# Patient Record
Sex: FEMALE | Race: WHITE | NOT HISPANIC OR LATINO | URBAN - METROPOLITAN AREA
[De-identification: names, ages, dates, MRNs, and addresses within clinical notes are randomized per-mention and may not be internally consistent; named-entity substitution may affect disease eponyms.]

---

## 2020-06-12 ENCOUNTER — TELEPHONE ENCOUNTER (OUTPATIENT)
Dept: URBAN - METROPOLITAN AREA CLINIC 92 | Facility: CLINIC | Age: 50
End: 2020-06-12

## 2020-06-12 RX ORDER — ESTRADIOL 2 MG/1
TAKE 1 TABLET (2 MG) BY ORAL ROUTE ONCE DAILY TABLET ORAL 1
Qty: 0 | Refills: 0 | Status: ACTIVE | COMMUNITY
Start: 1900-01-01 | End: 1900-01-01

## 2020-06-12 RX ORDER — RIFAXIMIN 550 MG/1
1 TABLET TABLET ORAL THREE TIMES A DAY
Qty: 42 TABLET | Refills: 3 | OUTPATIENT
Start: 2020-06-12 | End: 2020-08-07

## 2020-06-12 RX ORDER — B-COMPLEX WITH VITAMIN C
TABLET ORAL
Qty: 0 | Refills: 0 | Status: ACTIVE | COMMUNITY
Start: 1900-01-01 | End: 1900-01-01

## 2020-06-12 RX ORDER — FLUCONAZOLE 150 MG/1
TAKE 1 TABLET BY ORAL ROUTE EVERY OTHER WEEK TABLET ORAL 1
Qty: 1 | Refills: 0 | Status: ACTIVE | COMMUNITY
Start: 1900-01-01 | End: 1900-01-01

## 2020-06-12 RX ORDER — VITAMIN K2 40 MCG
TABLET ORAL
Qty: 0 | Refills: 0 | Status: ACTIVE | COMMUNITY
Start: 1900-01-01 | End: 1900-01-01

## 2020-06-12 RX ORDER — BUPROPION HCL 150 MG
TAKE 1 TABLET (150 MG) BY ORAL ROUTE ONCE DAILY TABLET, EXTENDED RELEASE 24 HR ORAL 1
Qty: 0 | Refills: 0 | Status: ACTIVE | COMMUNITY
Start: 1900-01-01 | End: 1900-01-01

## 2020-06-12 RX ORDER — PROGESTERONE 200 MG/1
TAKE 1 CAPSULE BY MOUTH ONCE DAILY CAPSULE ORAL
Qty: 0 | Refills: 0 | Status: ACTIVE | COMMUNITY
Start: 1900-01-01 | End: 1900-01-01

## 2020-06-16 ENCOUNTER — DASHBOARD ENCOUNTERS (OUTPATIENT)
Age: 50
End: 2020-06-16

## 2020-06-16 ENCOUNTER — OFFICE VISIT (OUTPATIENT)
Dept: URBAN - METROPOLITAN AREA TELEHEALTH 2 | Facility: TELEHEALTH | Age: 50
End: 2020-06-16
Payer: SELF-PAY

## 2020-06-16 DIAGNOSIS — R10.84 ABDOMINAL PAIN, GENERALIZED: ICD-10-CM

## 2020-06-16 DIAGNOSIS — R14.0 BLOATING: ICD-10-CM

## 2020-06-16 DIAGNOSIS — K58.2 IRRITABLE BOWEL SYNDROME WITH BOTH CONSTIPATION AND DIARRHEA: ICD-10-CM

## 2020-06-16 PROBLEM — 47812002: Status: ACTIVE | Noted: 2020-06-12

## 2020-06-16 PROCEDURE — G8427 DOCREV CUR MEDS BY ELIG CLIN: HCPCS | Performed by: NURSE PRACTITIONER

## 2020-06-16 PROCEDURE — 1036F TOBACCO NON-USER: CPT | Performed by: NURSE PRACTITIONER

## 2020-06-16 PROCEDURE — 99213 OFFICE O/P EST LOW 20 MIN: CPT | Performed by: NURSE PRACTITIONER

## 2020-06-16 PROCEDURE — G9903 PT SCRN TBCO ID AS NON USER: HCPCS | Performed by: NURSE PRACTITIONER

## 2020-06-16 PROCEDURE — G8420 CALC BMI NORM PARAMETERS: HCPCS | Performed by: NURSE PRACTITIONER

## 2020-06-16 RX ORDER — RIFAXIMIN 550 MG/1
1 TABLET TABLET ORAL THREE TIMES A DAY
Qty: 42 TABLET | Refills: 3
Start: 2020-06-12

## 2020-06-16 RX ORDER — ESTRADIOL 2 MG/1
TAKE 1 TABLET (2 MG) BY ORAL ROUTE ONCE DAILY TABLET ORAL 1
Qty: 0 | Refills: 0 | Status: ACTIVE | COMMUNITY
Start: 1900-01-01

## 2020-06-16 RX ORDER — FLUCONAZOLE 150 MG/1
TAKE 1 TABLET BY ORAL ROUTE EVERY OTHER WEEK TABLET ORAL 1
Qty: 1 | Refills: 0 | Status: ACTIVE | COMMUNITY
Start: 1900-01-01

## 2020-06-16 RX ORDER — RIFAXIMIN 550 MG/1
1 TABLET TABLET ORAL THREE TIMES A DAY
Qty: 42 TABLET | Refills: 3 | Status: ACTIVE | COMMUNITY
Start: 2020-06-12 | End: 2020-08-07

## 2020-06-16 RX ORDER — B-COMPLEX WITH VITAMIN C
TABLET ORAL
Qty: 0 | Refills: 0 | Status: ACTIVE | COMMUNITY
Start: 1900-01-01

## 2020-06-16 RX ORDER — BUPROPION HCL 150 MG
TAKE 1 TABLET (150 MG) BY ORAL ROUTE ONCE DAILY TABLET, EXTENDED RELEASE 24 HR ORAL 1
Qty: 0 | Refills: 0 | Status: ACTIVE | COMMUNITY
Start: 1900-01-01

## 2020-06-16 RX ORDER — PROGESTERONE 200 MG/1
TAKE 1 CAPSULE BY MOUTH ONCE DAILY CAPSULE ORAL
Qty: 0 | Refills: 0 | Status: ACTIVE | COMMUNITY
Start: 1900-01-01

## 2020-06-16 RX ORDER — VITAMIN K2 40 MCG
TABLET ORAL
Qty: 0 | Refills: 0 | Status: ACTIVE | COMMUNITY
Start: 1900-01-01

## 2020-06-16 NOTE — HPI-OTHER HISTORIES
6/16/2020 Clare presents for follow up of IBS D. She is flaring with bloating and abdominal cramping. She feels like she needs to repeat a course of xifaxan. MB  9/20/2019  Mrs. Bay presents today for f/u of her gas and bloating with abdominal pain.  Since our last visit, she took a round of Xifaxan, and she is feeling much better.  She did meet with Bets[facility].  She did strict FODMAPs, but she does not think she noticed a significant improvement in her symtpoms.  It is definately better when she avoids gluten and sugar. She started having problems with gas and bloating about 5 years ago.  She was drinking a lot of diet cokes at the time.  She saw Dr. Michel and had an EGD with some gastritis.  She eliminated the soda, and her blaoting and gas improved.  This past year, she started having more frequent episodes.  She saw Dr. Michel again. He felt that she may have SIBO.  She did not get better with probiotic.  She took Augmentin.  She finally improved when she eliminated sugar from her diet.  She is feeling a lot of fullness and gas in her upper abdomen that becomes quite painful.  She does not have problems with constipation or diarrhea.

## 2020-06-18 ENCOUNTER — WEB ENCOUNTER (OUTPATIENT)
Dept: URBAN - NONMETROPOLITAN AREA CLINIC 13 | Facility: CLINIC | Age: 50
End: 2020-06-18

## 2020-07-12 ENCOUNTER — WEB ENCOUNTER (OUTPATIENT)
Dept: URBAN - NONMETROPOLITAN AREA CLINIC 13 | Facility: CLINIC | Age: 50
End: 2020-07-12

## 2020-07-16 ENCOUNTER — WEB ENCOUNTER (OUTPATIENT)
Dept: URBAN - NONMETROPOLITAN AREA CLINIC 2 | Facility: CLINIC | Age: 50
End: 2020-07-16

## 2020-07-28 ENCOUNTER — OFFICE VISIT (OUTPATIENT)
Dept: URBAN - NONMETROPOLITAN AREA CLINIC 2 | Facility: CLINIC | Age: 50
End: 2020-07-28

## 2021-08-11 PROBLEM — 197125005: Status: ACTIVE | Noted: 2020-06-12

## 2021-08-23 ENCOUNTER — OFFICE VISIT (OUTPATIENT)
Dept: URBAN - NONMETROPOLITAN AREA SURGERY CENTER 1 | Facility: SURGERY CENTER | Age: 51
End: 2021-08-23

## 2021-10-25 ENCOUNTER — CLAIMS CREATED FROM THE CLAIM WINDOW (OUTPATIENT)
Dept: URBAN - NONMETROPOLITAN AREA SURGERY CENTER 1 | Facility: SURGERY CENTER | Age: 51
End: 2021-10-25

## 2021-10-25 ENCOUNTER — CLAIMS CREATED FROM THE CLAIM WINDOW (OUTPATIENT)
Dept: URBAN - NONMETROPOLITAN AREA SURGERY CENTER 1 | Facility: SURGERY CENTER | Age: 51
End: 2021-10-25
Payer: SELF-PAY

## 2021-10-25 ENCOUNTER — CLAIMS CREATED FROM THE CLAIM WINDOW (OUTPATIENT)
Dept: URBAN - METROPOLITAN AREA CLINIC 4 | Facility: CLINIC | Age: 51
End: 2021-10-25
Payer: SELF-PAY

## 2021-10-25 DIAGNOSIS — D12.2 BENIGN NEOPLASM OF ASCENDING COLON: ICD-10-CM

## 2021-10-25 DIAGNOSIS — D12.0 ADENOMA OF CECUM: ICD-10-CM

## 2021-10-25 DIAGNOSIS — D12.5 BENIGN NEOPLASM OF SIGMOID COLON: ICD-10-CM

## 2021-10-25 DIAGNOSIS — D12.2 ADENOMA OF ASCENDING COLON: ICD-10-CM

## 2021-10-25 PROCEDURE — 45385 COLONOSCOPY W/LESION REMOVAL: CPT | Performed by: INTERNAL MEDICINE

## 2021-10-25 PROCEDURE — 88305 TISSUE EXAM BY PATHOLOGIST: CPT | Performed by: PATHOLOGY

## 2021-10-25 PROCEDURE — G8907 PT DOC NO EVENTS ON DISCHARG: HCPCS | Performed by: INTERNAL MEDICINE
